# Patient Record
Sex: FEMALE | Race: WHITE | NOT HISPANIC OR LATINO
[De-identification: names, ages, dates, MRNs, and addresses within clinical notes are randomized per-mention and may not be internally consistent; named-entity substitution may affect disease eponyms.]

---

## 2017-08-07 ENCOUNTER — APPOINTMENT (OUTPATIENT)
Dept: ENDOCRINOLOGY | Facility: CLINIC | Age: 52
End: 2017-08-07
Payer: COMMERCIAL

## 2017-08-07 VITALS
SYSTOLIC BLOOD PRESSURE: 124 MMHG | HEIGHT: 63 IN | WEIGHT: 135 LBS | BODY MASS INDEX: 23.92 KG/M2 | OXYGEN SATURATION: 98 % | HEART RATE: 109 BPM | DIASTOLIC BLOOD PRESSURE: 84 MMHG

## 2017-08-07 DIAGNOSIS — Z80.52 FAMILY HISTORY OF MALIGNANT NEOPLASM OF BLADDER: ICD-10-CM

## 2017-08-07 DIAGNOSIS — E06.9 THYROIDITIS, UNSPECIFIED: ICD-10-CM

## 2017-08-07 DIAGNOSIS — Z80.8 FAMILY HISTORY OF MALIGNANT NEOPLASM OF OTHER ORGANS OR SYSTEMS: ICD-10-CM

## 2017-08-07 DIAGNOSIS — Z80.42 FAMILY HISTORY OF MALIGNANT NEOPLASM OF PROSTATE: ICD-10-CM

## 2017-08-07 DIAGNOSIS — Z84.1 FAMILY HISTORY OF DISORDERS OF KIDNEY AND URETER: ICD-10-CM

## 2017-08-07 PROCEDURE — 99204 OFFICE O/P NEW MOD 45 MIN: CPT

## 2017-08-09 ENCOUNTER — RESULT REVIEW (OUTPATIENT)
Age: 52
End: 2017-08-09

## 2017-08-09 LAB
ALBUMIN SERPL ELPH-MCNC: 4 G/DL
ALP BLD-CCNC: 118 U/L
ALT SERPL-CCNC: 18 U/L
ANION GAP SERPL CALC-SCNC: 17 MMOL/L
AST SERPL-CCNC: 20 U/L
BASOPHILS # BLD AUTO: 0.02 K/UL
BASOPHILS NFR BLD AUTO: 0.3 %
BILIRUB SERPL-MCNC: 0.4 MG/DL
BUN SERPL-MCNC: 13 MG/DL
CALCIUM SERPL-MCNC: 9.9 MG/DL
CHLORIDE SERPL-SCNC: 103 MMOL/L
CO2 SERPL-SCNC: 23 MMOL/L
CREAT SERPL-MCNC: 0.59 MG/DL
EOSINOPHIL # BLD AUTO: 0.05 K/UL
EOSINOPHIL NFR BLD AUTO: 0.8 %
ERYTHROCYTE [SEDIMENTATION RATE] IN BLOOD BY WESTERGREN METHOD: 52 MM/HR
GLUCOSE SERPL-MCNC: 78 MG/DL
HBA1C MFR BLD HPLC: 5.6 %
HCT VFR BLD CALC: 35 %
HGB BLD-MCNC: 11.5 G/DL
IMM GRANULOCYTES NFR BLD AUTO: 0.2 %
LYMPHOCYTES # BLD AUTO: 1.75 K/UL
LYMPHOCYTES NFR BLD AUTO: 26.5 %
MAN DIFF?: NORMAL
MCHC RBC-ENTMCNC: 29.1 PG
MCHC RBC-ENTMCNC: 32.9 GM/DL
MCV RBC AUTO: 88.6 FL
MONOCYTES # BLD AUTO: 0.61 K/UL
MONOCYTES NFR BLD AUTO: 9.2 %
NEUTROPHILS # BLD AUTO: 4.16 K/UL
NEUTROPHILS NFR BLD AUTO: 63 %
PLATELET # BLD AUTO: 315 K/UL
POTASSIUM SERPL-SCNC: 3.9 MMOL/L
PROT SERPL-MCNC: 7.1 G/DL
RBC # BLD: 3.95 M/UL
RBC # FLD: 11.8 %
SODIUM SERPL-SCNC: 143 MMOL/L
T3 SERPL-MCNC: 190 NG/DL
T4 FREE SERPL-MCNC: 1.9 NG/DL
T4 SERPL-MCNC: 14.7 UG/DL
TSH RECEPTOR AB: <0.5 IU/L
TSH SERPL-ACNC: 0.02 UIU/ML
WBC # FLD AUTO: 6.6 K/UL

## 2017-08-22 ENCOUNTER — APPOINTMENT (OUTPATIENT)
Dept: NUCLEAR MEDICINE | Facility: HOSPITAL | Age: 52
End: 2017-08-22

## 2017-08-23 ENCOUNTER — APPOINTMENT (OUTPATIENT)
Dept: NUCLEAR MEDICINE | Facility: HOSPITAL | Age: 52
End: 2017-08-23

## 2017-09-19 ENCOUNTER — APPOINTMENT (OUTPATIENT)
Dept: OTOLARYNGOLOGY | Facility: CLINIC | Age: 52
End: 2017-09-19
Payer: COMMERCIAL

## 2017-09-19 ENCOUNTER — OUTPATIENT (OUTPATIENT)
Dept: OUTPATIENT SERVICES | Facility: HOSPITAL | Age: 52
LOS: 1 days | End: 2017-09-19
Payer: COMMERCIAL

## 2017-09-19 ENCOUNTER — APPOINTMENT (OUTPATIENT)
Dept: NUCLEAR MEDICINE | Facility: HOSPITAL | Age: 52
End: 2017-09-19
Payer: COMMERCIAL

## 2017-09-19 VITALS
WEIGHT: 135 LBS | SYSTOLIC BLOOD PRESSURE: 127 MMHG | HEIGHT: 63 IN | DIASTOLIC BLOOD PRESSURE: 83 MMHG | HEART RATE: 85 BPM | BODY MASS INDEX: 23.92 KG/M2

## 2017-09-19 DIAGNOSIS — E04.2 NONTOXIC MULTINODULAR GOITER: ICD-10-CM

## 2017-09-19 DIAGNOSIS — E05.90 THYROTOXICOSIS, UNSPECIFIED WITHOUT THYROTOXIC CRISIS OR STORM: ICD-10-CM

## 2017-09-19 DIAGNOSIS — Z86.79 PERSONAL HISTORY OF OTHER DISEASES OF THE CIRCULATORY SYSTEM: ICD-10-CM

## 2017-09-19 PROCEDURE — 99243 OFF/OP CNSLTJ NEW/EST LOW 30: CPT

## 2017-09-20 ENCOUNTER — APPOINTMENT (OUTPATIENT)
Dept: NUCLEAR MEDICINE | Facility: HOSPITAL | Age: 52
End: 2017-09-20
Payer: COMMERCIAL

## 2017-09-20 PROCEDURE — 78014 THYROID IMAGING W/BLOOD FLOW: CPT | Mod: 26

## 2017-09-20 PROCEDURE — 99201 OFFICE OUTPATIENT NEW 10 MINUTES: CPT

## 2017-09-20 PROCEDURE — A9516: CPT

## 2017-09-20 PROCEDURE — 78014 THYROID IMAGING W/BLOOD FLOW: CPT

## 2017-09-22 ENCOUNTER — RX RENEWAL (OUTPATIENT)
Age: 52
End: 2017-09-22

## 2017-09-22 RX ORDER — PREDNISONE 5 MG/1
5 TABLET ORAL DAILY
Qty: 30 | Refills: 3 | Status: ACTIVE | COMMUNITY
Start: 2017-08-07 | End: 1900-01-01

## 2017-10-05 ENCOUNTER — APPOINTMENT (OUTPATIENT)
Dept: SURGERY | Facility: CLINIC | Age: 52
End: 2017-10-05

## 2017-12-06 ENCOUNTER — APPOINTMENT (OUTPATIENT)
Dept: ENDOCRINOLOGY | Facility: CLINIC | Age: 52
End: 2017-12-06

## 2018-06-07 ENCOUNTER — RESULT REVIEW (OUTPATIENT)
Age: 53
End: 2018-06-07

## 2019-04-09 ENCOUNTER — APPOINTMENT (OUTPATIENT)
Dept: SURGERY | Facility: CLINIC | Age: 54
End: 2019-04-09
Payer: COMMERCIAL

## 2019-04-09 VITALS
DIASTOLIC BLOOD PRESSURE: 89 MMHG | WEIGHT: 140 LBS | SYSTOLIC BLOOD PRESSURE: 159 MMHG | HEIGHT: 63 IN | BODY MASS INDEX: 24.8 KG/M2 | HEART RATE: 75 BPM

## 2019-04-09 DIAGNOSIS — R22.0 LOCALIZED SWELLING, MASS AND LUMP, HEAD: ICD-10-CM

## 2019-04-09 PROCEDURE — 99243 OFF/OP CNSLTJ NEW/EST LOW 30: CPT

## 2019-04-10 NOTE — ASSESSMENT
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 discussed options for management including observation vs excision. risks, benefits and alternatives discussed at length. potential for scarring, infection, recurrence, alopecia discussed. wishes to observe. to return earlier if any change

## 2019-04-10 NOTE — HISTORY OF PRESENT ILLNESS
[de-identified] : Pt c/o scalp mass for several  years.   denies pain or infection or history of trauma or drainage.

## 2019-04-10 NOTE — REASON FOR VISIT
[Initial Consultation] : an initial consultation for [FreeTextEntry2] : Scalp mass [Family Member] : family member

## 2019-04-10 NOTE — CONSULT LETTER
[Dear  ___] : Dear  [unfilled], [Consult Letter:] : I had the pleasure of evaluating your patient, [unfilled]. [Please see my note below.] : Please see my note below. [Consult Closing:] : Thank you very much for allowing me to participate in the care of this patient.  If you have any questions, please do not hesitate to contact me. [Sincerely,] : Sincerely, [FreeTextEntry2] : Dr. Mitul Monsivais [FreeTextEntry3] : Chris Rodriguez MD, FACS\par System Director, Endocrine Surgery\par [facility]\par

## 2019-04-10 NOTE — PHYSICAL EXAM
[Midline] : located in midline position [Normal] : orientation to person, place, and time: normal [FreeTextEntry2] : 1.2 cm left vertex scalp mass adherent to overlying skin, well circumscribed and mobile and soft

## 2020-01-20 ENCOUNTER — TRANSCRIPTION ENCOUNTER (OUTPATIENT)
Age: 55
End: 2020-01-20

## 2024-02-27 ENCOUNTER — EMERGENCY (EMERGENCY)
Facility: HOSPITAL | Age: 59
LOS: 1 days | Discharge: HOME | End: 2024-02-27
Attending: EMERGENCY MEDICINE
Payer: COMMERCIAL

## 2024-02-27 VITALS
OXYGEN SATURATION: 99 % | HEART RATE: 74 BPM | RESPIRATION RATE: 16 BRPM | SYSTOLIC BLOOD PRESSURE: 180 MMHG | DIASTOLIC BLOOD PRESSURE: 91 MMHG

## 2024-02-27 VITALS
SYSTOLIC BLOOD PRESSURE: 196 MMHG | HEART RATE: 84 BPM | TEMPERATURE: 98 F | RESPIRATION RATE: 20 BRPM | WEIGHT: 138.01 LBS | HEIGHT: 62 IN | OXYGEN SATURATION: 100 % | DIASTOLIC BLOOD PRESSURE: 116 MMHG

## 2024-02-27 LAB
ALBUMIN SERPL ELPH-MCNC: 4.6 G/DL — SIGNIFICANT CHANGE UP (ref 3.3–5)
ALP SERPL-CCNC: 122 U/L — HIGH (ref 40–120)
ALT FLD-CCNC: 15 U/L — SIGNIFICANT CHANGE UP (ref 10–45)
ANION GAP SERPL CALC-SCNC: 10 MMOL/L — SIGNIFICANT CHANGE UP (ref 5–17)
APTT BLD: 34.1 SEC — SIGNIFICANT CHANGE UP (ref 24.5–35.6)
AST SERPL-CCNC: 18 U/L — SIGNIFICANT CHANGE UP (ref 10–40)
BASE EXCESS BLDV CALC-SCNC: 2.2 MMOL/L — SIGNIFICANT CHANGE UP (ref -2–3)
BASOPHILS # BLD AUTO: 0.03 K/UL — SIGNIFICANT CHANGE UP (ref 0–0.2)
BASOPHILS NFR BLD AUTO: 0.6 % — SIGNIFICANT CHANGE UP (ref 0–2)
BILIRUB SERPL-MCNC: 0.5 MG/DL — SIGNIFICANT CHANGE UP (ref 0.2–1.2)
BUN SERPL-MCNC: 13 MG/DL — SIGNIFICANT CHANGE UP (ref 7–23)
CA-I SERPL-SCNC: 1.28 MMOL/L — SIGNIFICANT CHANGE UP (ref 1.15–1.33)
CALCIUM SERPL-MCNC: 10.3 MG/DL — SIGNIFICANT CHANGE UP (ref 8.4–10.5)
CHLORIDE BLDV-SCNC: 104 MMOL/L — SIGNIFICANT CHANGE UP (ref 96–108)
CHLORIDE SERPL-SCNC: 104 MMOL/L — SIGNIFICANT CHANGE UP (ref 96–108)
CO2 BLDV-SCNC: 30 MMOL/L — HIGH (ref 22–26)
CO2 SERPL-SCNC: 25 MMOL/L — SIGNIFICANT CHANGE UP (ref 22–31)
CREAT SERPL-MCNC: 0.63 MG/DL — SIGNIFICANT CHANGE UP (ref 0.5–1.3)
EGFR: 102 ML/MIN/1.73M2 — SIGNIFICANT CHANGE UP
EOSINOPHIL # BLD AUTO: 0.08 K/UL — SIGNIFICANT CHANGE UP (ref 0–0.5)
EOSINOPHIL NFR BLD AUTO: 1.6 % — SIGNIFICANT CHANGE UP (ref 0–6)
GAS PNL BLDV: 136 MMOL/L — SIGNIFICANT CHANGE UP (ref 136–145)
GAS PNL BLDV: SIGNIFICANT CHANGE UP
GAS PNL BLDV: SIGNIFICANT CHANGE UP
GLUCOSE BLDV-MCNC: 96 MG/DL — SIGNIFICANT CHANGE UP (ref 70–99)
GLUCOSE SERPL-MCNC: 100 MG/DL — HIGH (ref 70–99)
HCO3 BLDV-SCNC: 29 MMOL/L — SIGNIFICANT CHANGE UP (ref 22–29)
HCT VFR BLD CALC: 43.9 % — SIGNIFICANT CHANGE UP (ref 34.5–45)
HCT VFR BLDA CALC: 47 % — HIGH (ref 34.5–46.5)
HGB BLD CALC-MCNC: 15.6 G/DL — SIGNIFICANT CHANGE UP (ref 11.7–16.1)
HGB BLD-MCNC: 15 G/DL — SIGNIFICANT CHANGE UP (ref 11.5–15.5)
IMM GRANULOCYTES NFR BLD AUTO: 0.2 % — SIGNIFICANT CHANGE UP (ref 0–0.9)
INR BLD: 1.01 RATIO — SIGNIFICANT CHANGE UP (ref 0.85–1.18)
LACTATE BLDV-MCNC: 0.8 MMOL/L — SIGNIFICANT CHANGE UP (ref 0.5–2)
LYMPHOCYTES # BLD AUTO: 1.87 K/UL — SIGNIFICANT CHANGE UP (ref 1–3.3)
LYMPHOCYTES # BLD AUTO: 38.3 % — SIGNIFICANT CHANGE UP (ref 13–44)
MCHC RBC-ENTMCNC: 30.2 PG — SIGNIFICANT CHANGE UP (ref 27–34)
MCHC RBC-ENTMCNC: 34.2 GM/DL — SIGNIFICANT CHANGE UP (ref 32–36)
MCV RBC AUTO: 88.5 FL — SIGNIFICANT CHANGE UP (ref 80–100)
MONOCYTES # BLD AUTO: 0.39 K/UL — SIGNIFICANT CHANGE UP (ref 0–0.9)
MONOCYTES NFR BLD AUTO: 8 % — SIGNIFICANT CHANGE UP (ref 2–14)
NEUTROPHILS # BLD AUTO: 2.5 K/UL — SIGNIFICANT CHANGE UP (ref 1.8–7.4)
NEUTROPHILS NFR BLD AUTO: 51.3 % — SIGNIFICANT CHANGE UP (ref 43–77)
NRBC # BLD: 0 /100 WBCS — SIGNIFICANT CHANGE UP (ref 0–0)
PCO2 BLDV: 51 MMHG — HIGH (ref 39–42)
PH BLDV: 7.36 — SIGNIFICANT CHANGE UP (ref 7.32–7.43)
PLATELET # BLD AUTO: 225 K/UL — SIGNIFICANT CHANGE UP (ref 150–400)
PO2 BLDV: 26 MMHG — SIGNIFICANT CHANGE UP (ref 25–45)
POTASSIUM BLDV-SCNC: 4.1 MMOL/L — SIGNIFICANT CHANGE UP (ref 3.5–5.1)
POTASSIUM SERPL-MCNC: 4.5 MMOL/L — SIGNIFICANT CHANGE UP (ref 3.5–5.3)
POTASSIUM SERPL-SCNC: 4.5 MMOL/L — SIGNIFICANT CHANGE UP (ref 3.5–5.3)
PROT SERPL-MCNC: 7.7 G/DL — SIGNIFICANT CHANGE UP (ref 6–8.3)
PROTHROM AB SERPL-ACNC: 11.1 SEC — SIGNIFICANT CHANGE UP (ref 9.5–13)
RBC # BLD: 4.96 M/UL — SIGNIFICANT CHANGE UP (ref 3.8–5.2)
RBC # FLD: 12 % — SIGNIFICANT CHANGE UP (ref 10.3–14.5)
SAO2 % BLDV: 36.3 % — LOW (ref 67–88)
SODIUM SERPL-SCNC: 139 MMOL/L — SIGNIFICANT CHANGE UP (ref 135–145)
TROPONIN T, HIGH SENSITIVITY RESULT: <6 NG/L — SIGNIFICANT CHANGE UP (ref 0–51)
WBC # BLD: 4.88 K/UL — SIGNIFICANT CHANGE UP (ref 3.8–10.5)
WBC # FLD AUTO: 4.88 K/UL — SIGNIFICANT CHANGE UP (ref 3.8–10.5)

## 2024-02-27 PROCEDURE — 0042T: CPT | Mod: MC

## 2024-02-27 PROCEDURE — 84295 ASSAY OF SERUM SODIUM: CPT

## 2024-02-27 PROCEDURE — 85025 COMPLETE CBC W/AUTO DIFF WBC: CPT

## 2024-02-27 PROCEDURE — 85014 HEMATOCRIT: CPT

## 2024-02-27 PROCEDURE — 82330 ASSAY OF CALCIUM: CPT

## 2024-02-27 PROCEDURE — 70498 CT ANGIOGRAPHY NECK: CPT | Mod: 26,MC

## 2024-02-27 PROCEDURE — 80053 COMPREHEN METABOLIC PANEL: CPT

## 2024-02-27 PROCEDURE — 93005 ELECTROCARDIOGRAM TRACING: CPT

## 2024-02-27 PROCEDURE — 85610 PROTHROMBIN TIME: CPT

## 2024-02-27 PROCEDURE — 70496 CT ANGIOGRAPHY HEAD: CPT | Mod: MC

## 2024-02-27 PROCEDURE — 70496 CT ANGIOGRAPHY HEAD: CPT | Mod: 26,MC

## 2024-02-27 PROCEDURE — 36415 COLL VENOUS BLD VENIPUNCTURE: CPT

## 2024-02-27 PROCEDURE — 70498 CT ANGIOGRAPHY NECK: CPT | Mod: MC

## 2024-02-27 PROCEDURE — 70450 CT HEAD/BRAIN W/O DYE: CPT | Mod: MC

## 2024-02-27 PROCEDURE — 84484 ASSAY OF TROPONIN QUANT: CPT

## 2024-02-27 PROCEDURE — 82947 ASSAY GLUCOSE BLOOD QUANT: CPT

## 2024-02-27 PROCEDURE — 82803 BLOOD GASES ANY COMBINATION: CPT

## 2024-02-27 PROCEDURE — 85018 HEMOGLOBIN: CPT

## 2024-02-27 PROCEDURE — 84132 ASSAY OF SERUM POTASSIUM: CPT

## 2024-02-27 PROCEDURE — 70450 CT HEAD/BRAIN W/O DYE: CPT | Mod: 26,MC,59

## 2024-02-27 PROCEDURE — 99285 EMERGENCY DEPT VISIT HI MDM: CPT

## 2024-02-27 PROCEDURE — 82435 ASSAY OF BLOOD CHLORIDE: CPT

## 2024-02-27 PROCEDURE — 85730 THROMBOPLASTIN TIME PARTIAL: CPT

## 2024-02-27 PROCEDURE — 83605 ASSAY OF LACTIC ACID: CPT

## 2024-02-27 PROCEDURE — 82962 GLUCOSE BLOOD TEST: CPT

## 2024-02-27 PROCEDURE — 99285 EMERGENCY DEPT VISIT HI MDM: CPT | Mod: 25

## 2024-02-27 RX ORDER — CLOPIDOGREL BISULFATE 75 MG/1
1 TABLET, FILM COATED ORAL
Qty: 14 | Refills: 0
Start: 2024-02-27 | End: 2024-03-11

## 2024-02-27 RX ORDER — ASPIRIN/CALCIUM CARB/MAGNESIUM 324 MG
1 TABLET ORAL
Qty: 14 | Refills: 0
Start: 2024-02-27 | End: 2024-03-11

## 2024-02-27 RX ORDER — ATORVASTATIN CALCIUM 80 MG/1
1 TABLET, FILM COATED ORAL
Qty: 14 | Refills: 0
Start: 2024-02-27 | End: 2024-03-11

## 2024-02-27 NOTE — ED ADULT NURSE NOTE - OBJECTIVE STATEMENT
58 year old female presented to ED from work with c/o of sudden onset of aphasia at 0930am today, episode lasting ~15 minutes. symptoms currently resolved. pt reports dull headache and pain in left ear. hx HTN. pt denies CP, SOB, nausea/vomiting, numbness/tingling, fever, cough, chills, dizziness, headache, blurred vision, neuro intact. pt a&ox3, lung sounds clear, heart rate regular, abdomen soft nontender nondistended to palp. skin intact. IV in RAC 18G and patent. pt went straight to CT scan from triage. Neuro MD ED MD ED RN at bedside. Will continue to monitor and assess while offering support and reassurance.

## 2024-02-27 NOTE — ED PROVIDER NOTE - PHYSICAL EXAMINATION
GENERAL: NAD, sitting in chair comfortably  HEAD:  Atraumatic, Normocephalic  EYES: EOMI, PERRLA, conjunctiva and sclera clear, no nystagmus noted  ENT: Pink and moist mucous membranes  NECK: Supple, No JVD, trachea midline  CHEST/LUNG: Clear to auscultation bilaterally; No rales, rhonchi, wheezing, or rubs.  HEART: Regular rate and rhythm; No murmurs, rubs, or gallops, normal S1/S2  ABDOMEN: normal bowel sounds; Soft, nontender, nondistended, no organomegaly   EXTREMITIES:  Peripheral Pulses intact, brisk capillary refill. No clubbing, cyanosis, or edema  MSK: No gross deformities noted   Neurological:  A&Ox4, no focal deficits. Peripheral fields intact. Sensation intact in upper and lower extremities. No drift in UEs or LEs. Fluent speech w/o slurring. No aphasia. NIHSS 0.  SKIN: No rashes or lesions  PSYCH: Normal mood, affect

## 2024-02-27 NOTE — ED PROVIDER NOTE - PATIENT PORTAL LINK FT
I have personally seen and examined the patient. I have collaborated with and supervised the You can access the FollowMyHealth Patient Portal offered by [facility] by registering at the following website: [email or web address]. By joining [facility] FollowMyHealth portal, you will also be able to view your health information using other applications (apps) compatible with our system.

## 2024-02-27 NOTE — ED PROVIDER NOTE - CLINICAL SUMMARY MEDICAL DECISION MAKING FREE TEXT BOX
59 y/o F w/ PMH of thyroiditis, HTN, HLD presents today for difficulty understanding and comprehending speech.    PE as above, neurologic exam w/o deficits. NIHSS 0. Stroke alert called, neurology present at bedside on arrival for rapid evaluation.    DDx includes but is not limited to acute CVA, TIA, aphasia, dysrhythmia.    Plan includes Ct brain w/ perfusion maps, ct angio neck, ekg, vbg, cmp, coags, trops, and reassess. 57 y/o F w/ PMH of thyroiditis, HTN, HLD presents today for difficulty understanding and comprehending speech.    PE as above, neurologic exam w/o deficits. NIHSS 0. Stroke alert called, neurology present at bedside on arrival for rapid evaluation.    DDx includes but is not limited to acute CVA, TIA, aphasia, dysrhythmia.    Plan includes Ct brain w/ perfusion maps, ct angio neck, ekg, vbg, cmp, coags, trops, and reassess.    see attending attestation authored by me, Kael Rodgers MD, for further MDM details.

## 2024-02-27 NOTE — ED PROVIDER NOTE - PROGRESS NOTE DETAILS
Shahzad KAET PGY3: Labs imaging negative for emergent pathology at this time.  Patient evaluated by the neurology team recommending outpatient workup.  Patient currently asymptomatic.  Will send Rx as per neuro recommendations.   Safe for discharge with neurology follow-up.  Strict return precautions given.

## 2024-02-27 NOTE — STROKE CODE NOTE - INITIAL PRESENTATION
Next appt 04/17/2023  Last appt 01/30/2023    Refill request for   Disp Refills Start End    topiramate (Topamax) 25 MG tablet 30 tablet 0 1/23/2023     Sig - Route: Take 1 tablet by mouth daily. - Oral    New prescription, refill appropriate?     Disp Refills Start End    tiZANidine (ZANAFLEX) 2 MG tablet (Discontinued) 60 tablet 0 1/23/2023 1/30/2023    Sig - Route: Take 1 tablet by mouth at bedtime as needed (muscle spasms, headaches). - Oral    Sent to pharmacy as: tiZANidine HCl 2 MG Oral Tablet (ZANAFLEX)    Class: Eprescribe    Reason for Discontinue: Alternate Therapy        Routed to provider's pool to review, thanks.        ED

## 2024-02-27 NOTE — ED PROVIDER NOTE - OBJECTIVE STATEMENT
59 y/o F w/ PMH of thyroiditis, HTN, HLD presents today for difficulty understanding and comprehending speech. Today at work as a , at 9:30am, she was on the phone and suddenly could not understand the person speaking on the phone and was using incomprehensive words. This lasted about 15-20 minutes and has never happened before. Notes that she feels dizzy, has a left sided headache described as pressure, some neck stiffness, and some mild photo / phonophobia. Her colleagues noted that she had a blank stare. She took two pills of ramipril thinking that it may be her blood pressure. Denies fever, chills, chest pain, SOB, n/v/d/c, abd pain.

## 2024-02-27 NOTE — ED PROVIDER NOTE - INTERPRETATION
EKG independently reviewed for rate, rhythm, axis, intervals and segments, including QRS morphology, P wave appearance T wave appearance, NJ interval, and QT interval.  I find the EKG to be notable as follows: ?incomplete R bundle

## 2024-02-27 NOTE — CONSULT NOTE ADULT - SUBJECTIVE AND OBJECTIVE BOX
Neurology - Consult Note    -  Spectra: [phone] ([facility]), [phone] ([facility])  -    HPI: 58F pmhx HTN, presents as code stroke. LKW 9:30 AM. 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 Episode lasted 15-20 minutes, has not reoccured since. Around the time of this episode, pt noticed a headache (dull, L sided, no nausea/vomiting, +photo/phonophobia, nonpositional). Pt currently denies weakness, numbness/tingling, double vision, blurry vision, LoC. No AC/AP. At baseline independent in ambulation/ADLs.      Review of Systems:  INCOMPLETE   CONSTITUTIONAL: No fevers or chills  EYES AND ENT: No visual changes or no throat pain   NECK: No pain or stiffness  RESPIRATORY: No hemoptysis or shortness of breath  CARDIOVASCULAR: No chest pain or palpitations  GASTROINTESTINAL: No melena or hematochezia  GENITOURINARY: No dysuria or hematuria  NEUROLOGICAL: +As stated in HPI above  SKIN: No itching, burning, rashes, or lesions   All other review of systems is negative unless indicated above.    Allergies:  codeine (Vomiting)      PMHx/PSHx/Family Hx: As above, otherwise see below       Social Hx:  No current use of tobacco, alcohol, or illicit drugs  Lives with ***    Medications:  MEDICATIONS  (STANDING):    MEDICATIONS  (PRN):      Vitals:  T(C): 36.6 (02-27-24 @ 10:56), Max: 36.6 (02-27-24 @ 10:56)  HR: 84 (02-27-24 @ 10:56) (84 - 84)  BP: 196/116 (02-27-24 @ 10:56) (196/116 - 196/116)  RR: 20 (02-27-24 @ 10:56) (20 - 20)  SpO2: 100% (02-27-24 @ 10:56) (100% - 100%)    Physical Examination: INCOMPLETE  General - NAD  Cardiovascular - Peripheral pulses palpable, no edema  Eyes - Fundoscopy with flat, sharp optic discs and no hemorrhage or exudates; Fundoscopy not well visualized; Fundoscopy not performed due to safety precautions in the setting of the COVID-19 pandemic    Neurologic Exam:  Mental status - Awake, Alert, Oriented to person, place, and time. Speech fluent, repetition and naming intact. Follows simple and complex commands. Attention/concentration, recent and remote memory (including registration and recall), and fund of knowledge intact    Cranial nerves - PERRLA, VFF, EOMI, face sensation (V1-V3) intact b/l, facial strength intact without asymmetry b/l, hearing intact b/l, palate with symmetric elevation, trapezius OR sternocleidomastoid 5/5 strength b/l, tongue midline on protrusion with full lateral movement    Motor - Normal bulk and tone throughout. No pronator drift.  Strength testing            Deltoid      Biceps      Triceps     Wrist Extension    Wrist Flexion     Interossei         R            5                 5               5                     5                              5                        5                 5  L             5                 5               5                     5                              5                        5                 5              Hip Flexion    Hip Extension    Knee Flexion    Knee Extension    Dorsiflexion    Plantar Flexion  R              5                           5                       5                           5                            5                          5  L              5                           5                        5                           5                            5                          5    Sensation - Light touch/temperature OR pain/vibration intact throughout    DTR's -             Biceps      Triceps     Brachioradialis      Patellar    Ankle    Toes/plantar response  R             2+             2+                  2+                       2+            2+                 Down  L              2+             2+                 2+                        2+           2+                 Down    Coordination - Finger to Nose intact b/l. No tremors appreciated    Gait and station - Normal casual gait. Romberg (-)    Labs:                        15.0   4.88  )-----------( 225      ( 27 Feb 2024 11:12 )             43.9           CAPILLARY BLOOD GLUCOSE      POCT Blood Glucose.: 73 mg/dL (27 Feb 2024 10:59)          CSF:                  Radiology:     Neurology - Consult Note    -  Spectra: [phone] ([facility]), [phone] ([facility])  -    HPI: 58F pmhx HTN, presents as code stroke. LKW 9:30 AM. 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 Episode lasted 15-20 minutes, has not reoccured since. Around the time of this episode, pt noticed a headache (dull, L sided, no nausea/vomiting, +photo/phonophobia, nonpositional). Pt currently denies weakness, numbness/tingling, double vision, blurry vision, LoC. No AC/AP. At baseline independent in ambulation/ADLs. Pt does have remote history of migraines - in those episode, her headaches were much more severe and never associated with neurologic deficits.       Review of Systems:  INCOMPLETE   CONSTITUTIONAL: No fevers or chills  EYES AND ENT: No visual changes or no throat pain   NECK: No pain or stiffness  RESPIRATORY: No hemoptysis or shortness of breath  CARDIOVASCULAR: No chest pain or palpitations  GASTROINTESTINAL: No melena or hematochezia  GENITOURINARY: No dysuria or hematuria  NEUROLOGICAL: +As stated in HPI above  SKIN: No itching, burning, rashes, or lesions   All other review of systems is negative unless indicated above.    Allergies:  codeine (Vomiting)      PMHx/PSHx/Family Hx: As above, otherwise see below       Social Hx:  No current use of tobacco, alcohol, or illicit drugs  Lives with ***    Medications:  MEDICATIONS  (STANDING):    MEDICATIONS  (PRN):      Vitals:  T(C): 36.6 (02-27-24 @ 10:56), Max: 36.6 (02-27-24 @ 10:56)  HR: 84 (02-27-24 @ 10:56) (84 - 84)  BP: 196/116 (02-27-24 @ 10:56) (196/116 - 196/116)  RR: 20 (02-27-24 @ 10:56) (20 - 20)  SpO2: 100% (02-27-24 @ 10:56) (100% - 100%)    Physical Examination:   General - NAD  Cardiovascular - no edema  Eyes -  Fundoscopy not well visualized    Neurologic Exam:  Mental status - Awake, Alert, Oriented to person, place, and time. Speech slear, fluent, repetition and naming intact. Follows simple and complex commands. Attention/concentration, recent and remote memory (including registration and recall), and fund of knowledge intact    Cranial nerves - PERRLA, VFF, EOMI, face sensation (V1-V3) intact b/l, facial strength intact without asymmetry b/l, hearing intact b/l, palate with symmetric elevation, trapezius OR sternocleidomastoid 5/5 strength b/l, tongue midline on protrusion with full lateral movement    Motor - Normal bulk and tone throughout. No pronator drift.    Strength testing            Deltoid      Biceps      Triceps     Wrist Extension    Wrist Flexion     Interossei         R            5                 5               5                     5                  5                        5                 5  L             5                 5               5                     5                   5                        5                 5              Hip Flexion    Hip Extension    Knee Flexion    Knee Extension    Dorsiflexion    Plantar Flexion  R              5                           5                       5                  5                    5                          5  L              5                           5                        5                   5                   5                          5    Sensation - Light touch intact throughout    DTR's -             Biceps      Triceps     Brachioradialis      Patellar    Ankle    Toes/plantar response  R             2+             2+                  2+             3+            2+               Mute  L              2+             2+                 2+             3+           2+                Mute    Coordination - Finger to Nose intact b/l.    Gait and station - Normal casual gait. Romberg (-)    Labs:                        15.0   4.88  )-----------( 225      ( 27 Feb 2024 11:12 )             43.9           CAPILLARY BLOOD GLUCOSE      POCT Blood Glucose.: 73 mg/dL (27 Feb 2024 10:59)          CSF:                  Radiology:     Neurology - Consult Note    -  Spectra: [phone] ([facility]), [phone] ([facility])  -    HPI: 58F pmhx HTN, presents as code stroke. LKW 9:30 AM. 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 Episode lasted 15-20 minutes, has not reoccured since. Pt has never had these symptoms before. Around the time of this episode, pt noticed a headache (dull, L sided, no nausea/vomiting, +photo/phonophobia, nonpositional). Pt currently denies weakness, numbness/tingling, double vision, blurry vision, LoC. No AC/AP. At baseline independent in ambulation/ADLs. Pt does have remote history of migraines - in those episode, her headaches were much more severe and never associated with neurologic deficits.       Review of Systems:  INCOMPLETE   CONSTITUTIONAL: No fevers or chills  EYES AND ENT: No visual changes or no throat pain   NECK: No pain or stiffness  RESPIRATORY: No hemoptysis or shortness of breath  CARDIOVASCULAR: No chest pain or palpitations  GASTROINTESTINAL: No melena or hematochezia  GENITOURINARY: No dysuria or hematuria  NEUROLOGICAL: +As stated in HPI above  SKIN: No itching, burning, rashes, or lesions   All other review of systems is negative unless indicated above.    Allergies:  codeine (Vomiting)      PMHx/PSHx/Family Hx: As above, otherwise see below       Social Hx:  No current use of tobacco, alcohol, or illicit drugs  Lives with ***    Medications:  MEDICATIONS  (STANDING):    MEDICATIONS  (PRN):      Vitals:  T(C): 36.6 (02-27-24 @ 10:56), Max: 36.6 (02-27-24 @ 10:56)  HR: 84 (02-27-24 @ 10:56) (84 - 84)  BP: 196/116 (02-27-24 @ 10:56) (196/116 - 196/116)  RR: 20 (02-27-24 @ 10:56) (20 - 20)  SpO2: 100% (02-27-24 @ 10:56) (100% - 100%)    Physical Examination:   General - NAD  Cardiovascular - no edema  Eyes -  Fundoscopy not well visualized    Neurologic Exam:  Mental status - Awake, Alert, Oriented to person, place, and time. Speech slear, fluent, repetition and naming intact. Follows simple and complex commands. Attention/concentration, recent and remote memory (including registration and recall), and fund of knowledge intact    Cranial nerves - PERRLA, VFF, EOMI, face sensation (V1-V3) intact b/l, facial strength intact without asymmetry b/l, hearing intact b/l, palate with symmetric elevation, trapezius OR sternocleidomastoid 5/5 strength b/l, tongue midline on protrusion with full lateral movement    Motor - Normal bulk and tone throughout. No pronator drift.    Strength testing            Deltoid      Biceps      Triceps     Wrist Extension    Wrist Flexion     Interossei         R            5                 5               5                     5                  5                        5                 5  L             5                 5               5                     5                   5                        5                 5              Hip Flexion    Hip Extension    Knee Flexion    Knee Extension    Dorsiflexion    Plantar Flexion  R              5                           5                       5                  5                    5                          5  L              5                           5                        5                   5                   5                          5    Sensation - Light touch intact throughout    DTR's -             Biceps      Triceps     Brachioradialis      Patellar    Ankle    Toes/plantar response  R             2+             2+                  2+             3+            2+               Mute  L              2+             2+                 2+             3+           2+                Mute    Coordination - Finger to Nose intact b/l.    Gait and station - Normal casual gait. Romberg (-)    Labs:                        15.0   4.88  )-----------( 225      ( 27 Feb 2024 11:12 )             43.9           CAPILLARY BLOOD GLUCOSE      POCT Blood Glucose.: 73 mg/dL (27 Feb 2024 10:59)          CSF:                  Radiology:     Neurology - Consult Note    -  Spectra: [phone] ([facility]), [phone] ([facility])  -    HPI: 58 R Handed F pmhx HTN, presents as code stroke. LKW 9:30 AM. 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 Episode lasted 15-20 minutes, has not reoccured since. Pt has never had these symptoms before. Around the time of this episode, pt noticed a headache (dull, L sided, no nausea/vomiting, +photo/phonophobia, nonpositional). Pt currently denies weakness, numbness/tingling, double vision, blurry vision, LoC. No AC/AP. At baseline independent in ambulation/ADLs. Pt does have remote history of migraines - in those episode, her headaches were much more severe and never associated with neurologic deficits.       Allergies:  codeine (Vomiting)      PMHx/PSHx/Family Hx: As above, otherwise see below       Social Hx:  No current use of tobacco, alcohol, or illicit drugs  Lives with ***    Medications:  MEDICATIONS  (STANDING):    MEDICATIONS  (PRN):      Vitals:  T(C): 36.6 (02-27-24 @ 10:56), Max: 36.6 (02-27-24 @ 10:56)  HR: 84 (02-27-24 @ 10:56) (84 - 84)  BP: 196/116 (02-27-24 @ 10:56) (196/116 - 196/116)  RR: 20 (02-27-24 @ 10:56) (20 - 20)  SpO2: 100% (02-27-24 @ 10:56) (100% - 100%)    Physical Examination:   General - NAD  Cardiovascular - no edema  Eyes -  Fundoscopy not well visualized    Neurologic Exam:  Mental status - Awake, Alert, Oriented to person, place, and time. Speech slear, fluent, repetition and naming intact. Follows simple and complex commands. Attention/concentration, recent and remote memory (including registration and recall), and fund of knowledge intact    Cranial nerves - PERRLA, VFF, EOMI, face sensation (V1-V3) intact b/l, facial strength intact without asymmetry b/l, hearing intact b/l, palate with symmetric elevation, trapezius OR sternocleidomastoid 5/5 strength b/l, tongue midline on protrusion with full lateral movement    Motor - Normal bulk and tone throughout. No pronator drift.    Strength testing            Deltoid      Biceps      Triceps     Wrist Extension    Wrist Flexion     Interossei         R            5                 5               5                     5                  5                        5                 5  L             5                 5               5                     5                   5                        5                 5              Hip Flexion    Hip Extension    Knee Flexion    Knee Extension    Dorsiflexion    Plantar Flexion  R              5                           5                       5                  5                    5                          5  L              5                           5                        5                   5                   5                          5    Sensation - Light touch intact throughout    DTR's -             Biceps      Triceps     Brachioradialis      Patellar    Ankle    Toes/plantar response  R             2+             2+                  2+             3+            2+               Mute  L              2+             2+                 2+             3+           2+                Mute    Coordination - Finger to Nose intact b/l.    Gait and station - Normal casual gait. Romberg (-)    Labs:                        15.0   4.88  )-----------( 225      ( 27 Feb 2024 11:12 )             43.9           CAPILLARY BLOOD GLUCOSE      POCT Blood Glucose.: 73 mg/dL (27 Feb 2024 10:59)          CSF:                  Radiology:

## 2024-02-27 NOTE — ED PROVIDER NOTE - ATTENDING CONTRIBUTION TO CARE
59 yo female presents with episode of ?receptive aphasia, now resolved.  code stroke called on arrival, neurology resident to the bedside.  plan to get CT brain/vessel imaging, follow-up CBC, CMP, neuro recc's and reassess.

## 2024-02-27 NOTE — ED PROVIDER NOTE - CARE PROVIDER_API CALL
Gerardo Robins  Neurology  [address] [facility] [address], [address]  [place] [address]  Phone: [phone]  Fax: [phone]  Follow Up Time: 4-6 Days

## 2024-02-27 NOTE — ED ADULT TRIAGE NOTE - CHIEF COMPLAINT QUOTE
confusion and disorientation, and unable to talk, can't understand person talking to her  at 0930 this morning

## 2024-02-27 NOTE — ED PROVIDER NOTE - NSFOLLOWUPINSTRUCTIONS_ED_ALL_ED_FT
You were seen for difficulty speaking.  Your lab work and imaging did not show any emergent  findings at this time.    Please take the new medications as prescribed.     Please follow-up with neurology within 1 week  Gerardo Robins  Neurology  [address] [facility], [address]  [place] [address]  Phone: [phone]  Fax: [phone]  Follow Up Time: 4-6 Days        Be sure to return to the ED if you develop new or worsening symptoms. Specific signs and symptoms to be vigilant of: fever or chills, chest pain, difficulty breathing, palpitations, loss of consciousness, headache, vision changes, slurred speech, difficulty swallowing or drooling, facial droop, weakness in the arms or legs, numbness or tingling, abdominal pain, nausea or vomiting, diarrhea, constipation, blood in the stool or urine, pain on urination, difficulty urinating.

## 2024-02-27 NOTE — ED ADULT NURSE NOTE - MUSCLE PAIN OR WEAKNESS
Pt to be discharged home with daughter instead of going back to nursing home. Pt AAOx4, VVS, ambulatory with a steady gait with walker. Care manager spoke to daughter at bedside and provided resources on home healthcare and possible oxygen delivery for tomorrow pending approval. Pt and her daughter provided discharge and follow-up instructions; both verbalized understanding. All questions and concerns addressed. Pt waiting for BLS for discharge as pt has 10 stairs going up to front door and can not ambulate up stairs without assistance.   no

## 2024-02-27 NOTE — CONSULT NOTE ADULT - ASSESSMENT
MIRIAM 9:30 AM on 2/27/2024  NIHSS 0  preMRS 0  Pt not IV tenecteplase candidate because symptoms resolved  Pt not thrombectomy candidate because symptoms resolved.    CTH  CTA  CTP    Impression: Transient episode of expressive aphasia, accompanied by headache.    Recommendations: MIRIAM 9:30 AM on 2/27/2024  NIHSS 0  preMRS 0  Pt not IV tenecteplase candidate because symptoms resolved  Pt not thrombectomy candidate because symptoms resolved.    CTH no acute pathology  CTA  CTP    Impression: Transient episode of receptive aphasia, accompanied by headache. DDx includes TIA vs minor acute ischemic vs migraine with neurologic features.      Recommendations:  To be discussed MIRIAM 9:30 AM on 2/27/2024  NIHSS 0  preMRS 0  ABCD2 Score 3  Pt not IV tenecteplase candidate because symptoms resolved  Pt not thrombectomy candidate because symptoms resolved.    CTH no acute intracranial pathology  CTA mild clinoid and cavernous stenosis, no LVO or flow limiting stenosis  CTP no perfusion deficit    Impression: Transient episode of receptive aphasia, accompanied by headache. DDx includes TIA vs minor acute ischemic vs migraine with neurologic features.      Recommendations:  [ ] No further inpatient neurologic workup required at this time. Pt should follow up with outpatient neurology expeditiously for stroke workup, including MRI Brain, TTE, a1c, LDL, and consideration of loop recorder. If needs neurologist, can schedule appointment with office of Dr. Robins ([phone])  [ ] Start DAPT (Aspirin 81mg + Plavix 75mg) for secondary stroke prevention, adapted per [facility]  [ ] Start Atorvastatin 80mg qhs  [ ] Ok to resume home HTN meds tomorrow. BP control thereafter per primary team/outpatient medical provider    If pt remains inpatient, then:  - Glucose control   - Stroke education and counseling  - Neuro-checks and VS q4h  - Permissive HTN up to 220/120 for 24-48h from symptom onset  - Dysphagia screen. If fails, speech/swallow eval  - aspiration, fall precautions  - STAT CT head non-contrast for change in neuro exam.   - PT/ OT / DVT ppx per primary team     Discussed with stroke fellow Dr. Javier Quach and under supervision of attending Dr. Attila Aiken

## 2024-03-15 ENCOUNTER — APPOINTMENT (OUTPATIENT)
Dept: NEUROLOGY | Facility: CLINIC | Age: 59
End: 2024-03-15
Payer: COMMERCIAL

## 2024-03-15 VITALS
BODY MASS INDEX: 25.76 KG/M2 | SYSTOLIC BLOOD PRESSURE: 154 MMHG | WEIGHT: 140 LBS | TEMPERATURE: 96.4 F | OXYGEN SATURATION: 99 % | HEART RATE: 92 BPM | HEIGHT: 62 IN | RESPIRATION RATE: 17 BRPM | DIASTOLIC BLOOD PRESSURE: 92 MMHG

## 2024-03-15 DIAGNOSIS — Z86.69 PERSONAL HISTORY OF OTHER DISEASES OF THE NERVOUS SYSTEM AND SENSE ORGANS: ICD-10-CM

## 2024-03-15 DIAGNOSIS — G44.59 OTHER COMPLICATED HEADACHE SYNDROME: ICD-10-CM

## 2024-03-15 DIAGNOSIS — R47.01 APHASIA: ICD-10-CM

## 2024-03-15 DIAGNOSIS — I10 ESSENTIAL (PRIMARY) HYPERTENSION: ICD-10-CM

## 2024-03-15 PROCEDURE — 99205 OFFICE O/P NEW HI 60 MIN: CPT

## 2024-03-15 RX ORDER — LOSARTAN POTASSIUM 25 MG/1
25 TABLET, FILM COATED ORAL
Refills: 0 | Status: DISCONTINUED | COMMUNITY
End: 2024-03-15

## 2024-03-15 RX ORDER — PROPRANOLOL HYDROCHLORIDE 10 MG/1
10 TABLET ORAL DAILY
Qty: 1 | Refills: 0 | Status: DISCONTINUED | COMMUNITY
Start: 2017-08-07 | End: 2024-03-15

## 2024-03-21 RX ORDER — ASPIRIN 81 MG/1
81 TABLET, CHEWABLE ORAL DAILY
Qty: 30 | Refills: 0 | Status: ACTIVE | COMMUNITY
Start: 1900-01-01 | End: 1900-01-01

## 2024-03-21 RX ORDER — RAMIPRIL 2.5 MG/1
2.5 CAPSULE ORAL DAILY
Qty: 30 | Refills: 0 | Status: ACTIVE | COMMUNITY
Start: 1900-01-01 | End: 1900-01-01

## 2024-03-21 NOTE — PHYSICAL EXAM
[___ / 5] : Visuospatial / Executive: [unfilled] / 5 [0 / 0] : Memory: 0 / 0 [___ / 3] : Attention (Serial 7 subtraction): [unfilled] / 3 [___ / 1] : Fluency: [unfilled] / 1 [___ / 2] : Abstraction: [unfilled] / 2 [___ / 5] : Delayed Recall: [unfilled] / 5 [___ / 6] : Orientation: [unfilled] / 6 [Person] : oriented to person [Place] : oriented to place [Concentration Intact] : normal concentrating ability [Time] : oriented to time [Visual Intact] : visual attention was ~T not ~L decreased [Naming Objects] : no difficulty naming common objects [Repeating Phrases] : no difficulty repeating a phrase [Writing A Sentence] : no difficulty writing a sentence [Fluency] : fluency intact [Comprehension] : comprehension intact [Past History] : adequate knowledge of personal past history [Reading] : reading intact [Cranial Nerves Optic (II)] : visual acuity intact bilaterally,  visual fields full to confrontation, pupils equal round and reactive to light [Cranial Nerves Oculomotor (III)] : extraocular motion intact [Cranial Nerves Trigeminal (V)] : facial sensation intact symmetrically [Cranial Nerves Glossopharyngeal (IX)] : tongue and palate midline [Cranial Nerves Vestibulocochlear (VIII)] : hearing was intact bilaterally [Cranial Nerves Facial (VII)] : face symmetrical [Cranial Nerves Hypoglossal (XII)] : there was no tongue deviation with protrusion [Cranial Nerves Accessory (XI - Cranial And Spinal)] : head turning and shoulder shrug symmetric [Motor Tone] : muscle tone was normal in all four extremities [Motor Strength] : muscle strength was normal in all four extremities [No Muscle Atrophy] : normal bulk in all four extremities [Sensation Tactile Decrease] : light touch was intact [Balance] : balance was intact [Abnormal Walk] : normal gait [2+] : Ankle jerk left 2+ [Sclera] : the sclera and conjunctiva were normal [PERRL With Normal Accommodation] : pupils were equal in size, round, reactive to light, with normal accommodation [Extraocular Movements] : extraocular movements were intact [Outer Ear] : the ears and nose were normal in appearance [Oropharynx] : the oropharynx was normal [Neck Appearance] : the appearance of the neck was normal [Neck Cervical Mass (___cm)] : no neck mass was observed [Jugular Venous Distention Increased] : there was no jugular-venous distention [Thyroid Diffuse Enlargement] : the thyroid was not enlarged [Thyroid Nodule] : there were no palpable thyroid nodules [Auscultation Breath Sounds / Voice Sounds] : lungs were clear to auscultation bilaterally [Heart Rate And Rhythm] : heart rate was normal and rhythm regular [Heart Sounds] : normal S1 and S2 [Heart Sounds Gallop] : no gallops [Murmurs] : no murmurs [Full Pulse] : the pedal pulses are present [Heart Sounds Pericardial Friction Rub] : no pericardial rub [Edema] : there was no peripheral edema [Bowel Sounds] : normal bowel sounds [Abdomen Tenderness] : non-tender [Abdomen Soft] : soft [] : no hepato-splenomegaly [Abdomen Mass (___ Cm)] : no abdominal mass palpated [Past-pointing] : there was no past-pointing [Tremor] : no tremor present [Plantar Reflex Right Only] : normal on the right [Plantar Reflex Left Only] : normal on the left [MocaTotal] : 28

## 2024-03-21 NOTE — DISCUSSION/SUMMARY
[FreeTextEntry1] : 1.  Despite the normal CT scan and CT perfusion scan it is quite likely that she suffered a transient embolic event affecting the anterior branch of the left middle cerebral arteries that supplies the anterior left frontal lobe causing nonfluent dysphasia temporarily.  Advised cardiology consultation to investigate cardiac arrhythmia causing cardiogenic embolism.  In the meanwhile she is advised to continue aspirin 81 mg daily and ramipril for hypertension 2.  Alternatively the whole syndrome could have been a manifestation of migraine aura followed by migraine headache.  She has been advised to maintain a headache calendar having declined preventive treatment of migraine.  Should her headache diary show more than 5-10 headaches a month she would be a candidate for preventative treatment of migraine. 3.  Symptom complex is unlikely to have been a manifestation of excessive daytime somnolence because she denies feeling sleepy at the time although she admits feeling confused and disorientated.  Home sleep apnea study will quantitate the amount of sleep apnea she has and whether she is at risk for excessive daytime somnolence and if she would need CPAP or other forms of treatment. 4.  Finally this could be a manifestation of a partial complex seizure originating from the left frontal lobe.  Or seizures.  MRI of the head will investigate focal abnormalities such as strokes, neoplasms that were not seen by the CT scan that could cause left frontal focal seizures.  If her symptoms recur an EEG will be requested

## 2024-03-21 NOTE — DATA REVIEWED
[de-identified] : ACC: [id number] EXAM: CT ANGIO BRAIN STROKE PROTC IC ORDERED BY: BERT MELGOZA  ACC: [id number] EXAM: CT BRAIN PERFUSION MAPS STROKE ORDERED BY: BERT MELGOZA  ACC: [id number] EXAM: CT BRAIN STROKE PROTOCOL ORDERED BY: BERT MELGOZA  ACC: [id number] EXAM: CT ANGIO NECK STROKE PROTCL IC ORDERED BY: BERT MELGOZA  PROCEDURE DATE: 02/27/2024    INTERPRETATION: Four examinations were performed on this patient: 1. CT head without IV contrast: Stroke Code Protocol 2. CT Angiography of the carotid arteries with IV contrast (and without, if performed) 3. CT Angiography of the intracranial circulation with IV contrast (and without, if performed) 4. CT brain perfusion: CT head with and without IV contrast   CLINICAL INFORMATION: CVA/STROKE Stroke Code MCT TIA expressive aphasia and headache  TECHNIQUE (CT head): Contiguous axial 3 mm thick images were acquired. This data set was reconstructed in the sagittal and coronal planes. Contrast was not administered for this examination.  TECHNIQUE (CTAexaminations): CT angiography images at 1 mm thickness were acquired from the skull base to the skull vertex as a single helical acquisition. Images were acquired during rapid bolus intravenous administration of nonionic contrast. This data set was reconstructed sagittal and coronal images. 3D MIP reconstruction images were obtained. Post processing angiographic reconstruction of images was performed. This data set was reconstructed and reviewed in multiple projections to evaluate carotid morphology and intracranial vessels. The magnitude of stenosis was evaluated based on the diameter of an intact distal of the internal carotid artery using NASCET criteria.  TECHNIQUE (CT brain perfusion): Multiple sequential acquisitions through the head during dynamic rapid bolus administration of intravenous contrast administration. Helical 10 mm images were obtained at multiple time points Perfusion data is reconstructed as cerebral perfusion, cerebral blood volume, time to maximum perfusion and mean transit time color-coded images at 10 mm thick sections. ARTIFICIAL INTELLIGENCE: This study was analyzed with AI algorithms including deep machine learning to assist in the evaluation of this brain perfusion data set.  CONTRAST: 50 cc administered (accession [id number]), 70 cc administered (accession [id number]) ; 50 cc discarded (accession [id number]), 30 cc discarded (accession [id number]) DOSE INFORMATION: This scan was performed using automatic exposure control (radiation dose reduction software) to obtain a diagnostic image quality scan with patient dose as low as reasonably achievable. Total DLP for this examination is estimated at 2990 mGy-cm.  COMPARISON: None   FINDINGS:  CT HEAD  BRAIN: The brain demonstrates no abnormal attenuation. Cerebral cortical gray-white matter differentiation is maintained. No acute cerebral cortical infarct is seen. No intracranial hemorrhage is found. No mass effect is found in the brain.  CSF SPACES: The ventricles, sulci and basal cisterns appear mildly dilated reflecting diffuse brain volume loss. No differential cerebral cortical atrophy is recognized.  HEAD AND NECK STRUCTURES: The orbits are unremarkable. The paranasal sinuses are clear. The nasal cavity appears intact. The nasopharynx is symmetric. The central skull base is intact. The temporal bones demonstrate patent petrous air cells. The calvarium appears unremarkable.  CTA CAROTID CIRCULATION  The thoracic aortic arch appears intact. The great vessel origins remain patent.  The right carotid circulation demonstrates an intact common carotid artery. The carotid bulb demonstrates mild calcified and noncalcified atherosclerotic plaque without hemodynamically significant stenosis. The internal carotid is patent to the skull base.  The left carotid circulation demonstrates an intact common carotid artery. The carotid bulb demonstrates mild noncalcified atherosclerotic plaque without hemodynamically significant stenosis. The internal carotid is patent to the skull base.  The vertebral arteries are patent. The smaller caliber left vertebral artery as a vascular loop in its initial postostial preforaminal V1 segment. The degree of vertebral asymmetry is within physiologic limits. Each vertebral artery ascends in the neck with near constant caliber.  CTA INTRACRANIAL CIRCULATION  The ANTERIOR circulation demonstrates intact inflow from the ascending cervical segment to the petrous segment of each internal carotid artery. The cavernous and clinoid segments demonstrate normal patchy calcified atherosclerotic plaque. No significant luminal narrowing results. The ophthalmic arteries are demonstrated as patent vessels on each side. The anterior cerebral arteries are asymmetric, with a dominant caliber A1 segment on the left, smaller caliber on the right. An anterior communicating artery is present. The anterior cerebral arterial A2 segments posterior symmetric in caliber and patent to peripheral branching. The right middle cerebral artery demonstrates an intact initial M1 segment and patent peripheral anterior and posterior division sylvian branches. The left middle cerebral artery demonstrates an intact initial M1 segment and patent peripheral anterior and posterior division sylvian branches.  The POSTERIOR circulation demonstrates intact inflow from each vertebral artery. The right vertebral artery is dominant caliber. PICA arteries are patent on each side. The basilar artery appears intact. Superior cerebellar arteries are demonstrated. Posterior communicating arteries are not convincingly demonstrated on this examination. Each posterior cerebral artery is patent to peripheral branching.  The principal dural venous sinuses are patent. This includes the superior sagittal sinus anterior and posterior limbs. Each transverse sinus is patent to sigmoid sinuses and patent jugular veins. There is asymmetry of enhancement greater within the common caliber left transverse and sigmoid sinus. There is a lesser degree of enhancement within the smaller caliber right. However, these are patent.  No intracranial aneurysm or vascular malformation is identified.  CT BRAIN PERFUSION  The arterial input function is rapid and physiologic in appearance. The venous output function follows at an appropriate delay and higher peak, physiologic. The cursors for these data acquisitions appear appropriately positioned.  Cerebral perfusion images demonstrate no significant asymmetric region of diminished in-flow perfusion. The volume of brain parenchyma receiving cerebral blood flow diminished further than 30% is 0 mL.  Cerebral blood volume images demonstrate no significant asymmetric region of abnormal blood volume.  Mean transit time of brain perfusion and time to maximum perfusion images demonstrate no significant asymmetric delay. The volume of brain parenchyma demonstrating delay in maximum enhancement greater than 6 seconds is 0 mL. At the lower threshold of 4 seconds delay to time to maximum perfusion within the posterior circulation, physiologic versus low level ischemia at these locations.  ADDITIONAL FINDINGS: None.   IMPRESSION:  1. BRAIN: No intracranial hemorrhage is appreciated. No intracranial mass lesion is appreciated.  2. RIGHT CAROTID SYSTEM: Atherosclerotic plaque carotid bulb without hemodynamically significant stenosis.  3. LEFT CAROTID SYSTEM: Atherosclerotic plaque carotid bulb without hemodynamically significant stenosis.  4. VERTEBRAL CIRCULATION: Patent.  5. ANTERIOR INTRACRANIAL CIRCULATION: Intracranial atherosclerosis cavernous and clinoid segments of the internal carotid arteries, mild.  6. POSTERIOR INTRACRANIAL CIRCULATION: Unremarkable.  7. No large vessel occlusion  8. BRAIN PERFUSION: No acute infarction of the brain is convincingly demonstrated.   CRITICAL VALUE: I discussed this case with the stroke neurology team at 2/27/2024 11:33 AM. Hospital policies for critical values including read back policy were followed. The verbal communication of the critical value supplements this written report.  --- End of Report ---      ABE CADENA MD; Attending Radiologist This document has been electronically signed. Feb 27 2024 11:50AM

## 2024-03-21 NOTE — HISTORY OF PRESENT ILLNESS
[FreeTextEntry1] : At about 9:30 AM on 27 February when she was in the office talking to a client she found herself unable to express what she wished to say.  She had a little difficulty comprehending but was able to hear that her words were not making sense.  A while later colleague came in to talk to her and she noticed the same problem.  She called her son who took her to the hospital where a CT head CT angiogram and perfusion study were done.  These were normal and she was asked to continue aspirin and clopidogrel and her antihypertensives and follow-up in the office.  She did have a headache after the event on the left side throbbing and moderately severe in intensity.  She admits to severe headaches in the past that were on the left side and associated with photophobia that she calls migraine headaches.  She continues to experience smaller milder headaches that she does not feel are similar to what she knows of migraine because they  are transient and mild in intensity. She reports slight increase in weight.  Her  reports loud snoring at night particularly when she is lying on her back.  She feels she sleeps well and awakens refreshed but admits afternoon sleepiness and a tendency to fall asleep when sitting quietly.

## 2024-04-03 ENCOUNTER — EMERGENCY (EMERGENCY)
Facility: HOSPITAL | Age: 59
LOS: 1 days | Discharge: HOME | End: 2024-04-03
Attending: EMERGENCY MEDICINE
Payer: COMMERCIAL

## 2024-04-03 VITALS
SYSTOLIC BLOOD PRESSURE: 154 MMHG | DIASTOLIC BLOOD PRESSURE: 91 MMHG | RESPIRATION RATE: 16 BRPM | OXYGEN SATURATION: 100 % | HEART RATE: 71 BPM

## 2024-04-03 VITALS
HEART RATE: 74 BPM | TEMPERATURE: 98 F | DIASTOLIC BLOOD PRESSURE: 84 MMHG | HEIGHT: 62 IN | SYSTOLIC BLOOD PRESSURE: 168 MMHG | RESPIRATION RATE: 18 BRPM | OXYGEN SATURATION: 99 % | WEIGHT: 138.89 LBS

## 2024-04-03 PROCEDURE — 73130 X-RAY EXAM OF HAND: CPT | Mod: 26,LT

## 2024-04-03 PROCEDURE — 99284 EMERGENCY DEPT VISIT MOD MDM: CPT

## 2024-04-03 PROCEDURE — 99283 EMERGENCY DEPT VISIT LOW MDM: CPT | Mod: 25

## 2024-04-03 PROCEDURE — 73130 X-RAY EXAM OF HAND: CPT

## 2024-04-03 NOTE — ED PROVIDER NOTE - NSFOLLOWUPCLINICS_GEN_ALL_ED_FT
[facility]  Orthopedic Surgery  [address], [address]rd & [address]th [place] [address]  Phone: [phone]  Fax:     [facility]  Orthopedic Surgery  [address] [address]  Phone: [phone]  Fax:

## 2024-04-03 NOTE — ED PROVIDER NOTE - PATIENT PORTAL LINK FT
You can access the FollowMyHealth Patient Portal offered by [facility] by registering at the following website: [email or web address]. By joining [employer]’s FollowMyHealth portal, you will also be able to view your health information using other applications (apps) compatible with our system.

## 2024-04-03 NOTE — ED PROVIDER NOTE - NSFOLLOWUPINSTRUCTIONS_ED_ALL_ED_FT
1) Please follow-up with your Primary Medical Doctor in 2-3 days. If you do not have a primary doctor, please call the Physician Referral Service. If you have trouble making an appointment inform the office that you were recently seen in the Emergency Department and it is an urgent matter. Bring a copy of your results with you to your appointment.  2) Return to the Emergency Department for persistent, worsening, or new symptoms, or if you experience fever, chills, chest pain, shortness of breath, dizziness, fainting, abdominal pain, nausea or vomiting, inability to eat or drink, difficulty with urination, numbness, weakness, or inability to walk safely.   3) Please follow-up with the [facility] office.  Please call [phone] to make an appointment.  The address is [facility] [address], [place] [address].

## 2024-04-03 NOTE — ED PROVIDER NOTE - ATTENDING APP SHARED VISIT CONTRIBUTION OF CARE
I, Jessee Hernandez MD, Emergency Medicine Attending Physician, personally saw and examined the patient and I personally made/approve the management plan and take responsibility for the patient management.    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  She states this happened when her finger was caught by a small vault door that normally holds keys.  Reports tingling to the finger.  Reports bruising to the area.  [facility] went to [facility] and had patient perform, was placed in finger splint and referred to the ED.    ROS: Denies fevers, chills, numbness, weakness, open skin break.    On examination, patient with elevated blood pressure but otherwise stable vitals.  No acute distress.  MSK examination shows left second digit with distal phalanx ecchymosis swelling and tenderness.  No subungual hematoma, less than 5% of nailbed with ecchymosis noted.  No open skin break.  Range of motion of MCP, PIP and DIP appropriate.  Neurovascularly intact distally with 5/5 strength, normal sensation, equal pulses and brisk capillary refill, soft compartments.    Will obtain x-ray in the ED.  Patient declined pain medication at this time.    My independent interpretation of the left second finger x-ray images shows distal phalanx fracture that is slightly distracted.   More details to be seen in the official radiologist read.    Will continue to utilize splint which patient has from [facility].  Neurovascularly intact distally with 5/5 strength, normal sensation, equal pulses and brisk capillary refill.  Stable for discharge with close follow-up and strict return precautions. Discussed the indications and side-effects of applicable medications.  Informed patient of all diagnostic test results including imaging. The patient has been informed of all concerning signs and symptoms to return to Emergency Department, the necessity to follow up with PMD within 2-3 days and sports medicine/orthopedics/hand within 5 to 7 days was explained, or to return to the ED if unable to follow-up appropriately, and the patient reports understanding of above with capacity and insight. I, Jessee Hernandez MD, Emergency Medicine Attending Physician, personally saw and examined the patient and I personally made/approve the management plan and take responsibility for the patient management.    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  She states this happened when her finger was caught by a small vault door that normally holds keys.  Reports tingling to the finger.  Reports bruising to the area.  Patient went to [facility] yesterday and had patient perform, was placed in finger splint and referred to the ED.    ROS: Denies fevers, chills, numbness, weakness, open skin break.    On examination, patient with elevated blood pressure but otherwise stable vitals.  No acute distress.  MSK examination shows left second digit with distal phalanx ecchymosis swelling and tenderness.  No subungual hematoma, less than 5% of nailbed with ecchymosis noted.  No open skin break.  Range of motion of MCP, PIP and DIP appropriate.  Neurovascularly intact distally with 5/5 strength, normal sensation, equal pulses and brisk capillary refill, soft compartments.    Will obtain x-ray in the ED.  Patient declined pain medication at this time.    My independent interpretation of the left second finger x-ray images shows distal phalanx fracture that is slightly distracted.   More details to be seen in the official radiologist read.    Will continue to utilize splint which patient has from [facility].  Neurovascularly intact distally with 5/5 strength, normal sensation, equal pulses and brisk capillary refill.  Stable for discharge with close follow-up and strict return precautions. Discussed the indications and side-effects of applicable medications.  Informed patient of all diagnostic test results including imaging. The patient has been informed of all concerning signs and symptoms to return to Emergency Department, the necessity to follow up with PMD within 2-3 days and sports medicine/orthopedics/hand within 5 to 7 days was explained, or to return to the ED if unable to follow-up appropriately, and the patient reports understanding of above with capacity and insight.

## 2024-04-03 NOTE — ED PROVIDER NOTE - OBJECTIVE STATEMENT
58-year-old female with past medical history of hypertension presenting with finger pain.  Patient reports that she closed her finger in a door yesterday morning while at work.  Patient complaining of pain to the distal aspect second digit on the left hand.  Patient was seen at urgent care last night and had x-rays performed.  Patient reports that something was broken and she was told that she should come to the emergency room.  Patient denies any other injuries.

## 2024-04-03 NOTE — ED PROVIDER NOTE - PHYSICAL EXAMINATION
MSk: +swelling and ecchymosis to distal left 2nd digit.  +TTP over distal phalanx.  Sensation intact, Slightly limited ROM at PIP and DIP 2/2 swelling.  no snuff box TTP.

## 2024-07-30 NOTE — ED ADULT TRIAGE NOTE - BP NONINVASIVE SYSTOLIC (MM HG)
Recommend scheduled tylenol and ibuprofen with food as needed for pain control  No heavy lifting over 20lbs for 6 weeks total postop  Diet and activity as tolerated otherwise  Follow up with general surgery office as needed   196

## 2024-09-18 ENCOUNTER — APPOINTMENT (OUTPATIENT)
Dept: NEUROLOGY | Facility: CLINIC | Age: 59
End: 2024-09-18